# Patient Record
Sex: FEMALE | Race: OTHER | HISPANIC OR LATINO | URBAN - METROPOLITAN AREA
[De-identification: names, ages, dates, MRNs, and addresses within clinical notes are randomized per-mention and may not be internally consistent; named-entity substitution may affect disease eponyms.]

---

## 2017-01-27 ENCOUNTER — OUTPATIENT (OUTPATIENT)
Dept: OUTPATIENT SERVICES | Facility: HOSPITAL | Age: 27
LOS: 1 days | Discharge: HOME | End: 2017-01-27

## 2017-01-27 ENCOUNTER — APPOINTMENT (OUTPATIENT)
Dept: SPEECH THERAPY | Facility: CLINIC | Age: 27
End: 2017-01-27

## 2017-01-27 DIAGNOSIS — Z98.89 OTHER SPECIFIED POSTPROCEDURAL STATES: Chronic | ICD-10-CM

## 2017-01-30 DIAGNOSIS — H90.3 SENSORINEURAL HEARING LOSS, BILATERAL: ICD-10-CM

## 2017-04-07 ENCOUNTER — APPOINTMENT (OUTPATIENT)
Dept: SPEECH THERAPY | Facility: CLINIC | Age: 27
End: 2017-04-07

## 2017-06-30 ENCOUNTER — APPOINTMENT (OUTPATIENT)
Dept: SPEECH THERAPY | Facility: CLINIC | Age: 27
End: 2017-06-30

## 2017-06-30 ENCOUNTER — OUTPATIENT (OUTPATIENT)
Dept: OUTPATIENT SERVICES | Facility: HOSPITAL | Age: 27
LOS: 1 days | Discharge: HOME | End: 2017-06-30

## 2017-06-30 DIAGNOSIS — Z98.89 OTHER SPECIFIED POSTPROCEDURAL STATES: Chronic | ICD-10-CM

## 2017-07-05 DIAGNOSIS — H90.3 SENSORINEURAL HEARING LOSS, BILATERAL: ICD-10-CM

## 2017-08-18 ENCOUNTER — APPOINTMENT (OUTPATIENT)
Dept: SPEECH THERAPY | Facility: CLINIC | Age: 27
End: 2017-08-18

## 2017-10-03 ENCOUNTER — APPOINTMENT (OUTPATIENT)
Dept: SPEECH THERAPY | Facility: CLINIC | Age: 27
End: 2017-10-03

## 2017-12-29 ENCOUNTER — APPOINTMENT (OUTPATIENT)
Dept: SPEECH THERAPY | Facility: CLINIC | Age: 27
End: 2017-12-29

## 2018-02-06 ENCOUNTER — OUTPATIENT (OUTPATIENT)
Dept: OUTPATIENT SERVICES | Facility: HOSPITAL | Age: 28
LOS: 1 days | Discharge: HOME | End: 2018-02-06

## 2018-02-06 ENCOUNTER — APPOINTMENT (OUTPATIENT)
Dept: SPEECH THERAPY | Facility: CLINIC | Age: 28
End: 2018-02-06

## 2018-02-06 DIAGNOSIS — Z98.89 OTHER SPECIFIED POSTPROCEDURAL STATES: Chronic | ICD-10-CM

## 2018-03-05 DIAGNOSIS — H90.3 SENSORINEURAL HEARING LOSS, BILATERAL: ICD-10-CM

## 2018-08-17 ENCOUNTER — APPOINTMENT (OUTPATIENT)
Dept: SPEECH THERAPY | Facility: CLINIC | Age: 28
End: 2018-08-17

## 2018-10-26 ENCOUNTER — OUTPATIENT (OUTPATIENT)
Dept: OUTPATIENT SERVICES | Facility: HOSPITAL | Age: 28
LOS: 1 days | Discharge: HOME | End: 2018-10-26

## 2018-10-26 ENCOUNTER — APPOINTMENT (OUTPATIENT)
Dept: SPEECH THERAPY | Facility: CLINIC | Age: 28
End: 2018-10-26

## 2018-10-26 DIAGNOSIS — H90.3 SENSORINEURAL HEARING LOSS, BILATERAL: ICD-10-CM

## 2018-10-26 DIAGNOSIS — Z98.89 OTHER SPECIFIED POSTPROCEDURAL STATES: Chronic | ICD-10-CM

## 2019-04-04 ENCOUNTER — APPOINTMENT (OUTPATIENT)
Dept: SPEECH THERAPY | Facility: CLINIC | Age: 29
End: 2019-04-04

## 2019-12-17 ENCOUNTER — OUTPATIENT (OUTPATIENT)
Dept: OUTPATIENT SERVICES | Facility: HOSPITAL | Age: 29
LOS: 1 days | Discharge: HOME | End: 2019-12-17

## 2019-12-17 ENCOUNTER — APPOINTMENT (OUTPATIENT)
Dept: SPEECH THERAPY | Facility: CLINIC | Age: 29
End: 2019-12-17

## 2019-12-17 DIAGNOSIS — Z98.89 OTHER SPECIFIED POSTPROCEDURAL STATES: Chronic | ICD-10-CM

## 2019-12-30 DIAGNOSIS — H90.3 SENSORINEURAL HEARING LOSS, BILATERAL: ICD-10-CM

## 2020-02-28 ENCOUNTER — NON-APPOINTMENT (OUTPATIENT)
Age: 30
End: 2020-02-28

## 2021-04-05 ENCOUNTER — RESULT REVIEW (OUTPATIENT)
Age: 31
End: 2021-04-05

## 2021-07-23 ENCOUNTER — OUTPATIENT (OUTPATIENT)
Dept: OUTPATIENT SERVICES | Facility: HOSPITAL | Age: 31
LOS: 1 days | Discharge: HOME | End: 2021-07-23

## 2021-07-23 ENCOUNTER — APPOINTMENT (OUTPATIENT)
Dept: SPEECH THERAPY | Facility: CLINIC | Age: 31
End: 2021-07-23

## 2021-07-23 DIAGNOSIS — Z98.89 OTHER SPECIFIED POSTPROCEDURAL STATES: Chronic | ICD-10-CM

## 2021-09-28 ENCOUNTER — APPOINTMENT (OUTPATIENT)
Dept: SPEECH THERAPY | Facility: CLINIC | Age: 31
End: 2021-09-28

## 2021-10-22 DIAGNOSIS — H90.3 SENSORINEURAL HEARING LOSS, BILATERAL: ICD-10-CM

## 2022-06-29 ENCOUNTER — RESULT REVIEW (OUTPATIENT)
Age: 32
End: 2022-06-29

## 2022-08-05 ENCOUNTER — APPOINTMENT (OUTPATIENT)
Dept: SPEECH THERAPY | Facility: CLINIC | Age: 32
End: 2022-08-05

## 2022-08-05 ENCOUNTER — OUTPATIENT (OUTPATIENT)
Dept: OUTPATIENT SERVICES | Facility: HOSPITAL | Age: 32
LOS: 1 days | Discharge: HOME | End: 2022-08-05

## 2022-08-05 DIAGNOSIS — Z98.89 OTHER SPECIFIED POSTPROCEDURAL STATES: Chronic | ICD-10-CM

## 2022-08-18 DIAGNOSIS — H90.3 SENSORINEURAL HEARING LOSS, BILATERAL: ICD-10-CM

## 2022-08-26 ENCOUNTER — APPOINTMENT (OUTPATIENT)
Dept: SPEECH THERAPY | Facility: CLINIC | Age: 32
End: 2022-08-26

## 2022-09-29 ENCOUNTER — APPOINTMENT (OUTPATIENT)
Dept: SPEECH THERAPY | Facility: CLINIC | Age: 32
End: 2022-09-29

## 2022-12-07 ENCOUNTER — RESULT REVIEW (OUTPATIENT)
Age: 32
End: 2022-12-07

## 2022-12-07 ENCOUNTER — APPOINTMENT (OUTPATIENT)
Dept: UROLOGY | Facility: CLINIC | Age: 32
End: 2022-12-07

## 2022-12-07 VITALS
TEMPERATURE: 98 F | HEART RATE: 73 BPM | DIASTOLIC BLOOD PRESSURE: 72 MMHG | OXYGEN SATURATION: 100 % | HEIGHT: 67 IN | RESPIRATION RATE: 16 BRPM | SYSTOLIC BLOOD PRESSURE: 112 MMHG

## 2022-12-07 DIAGNOSIS — N20.0 CALCULUS OF KIDNEY: ICD-10-CM

## 2022-12-07 DIAGNOSIS — Z87.440 PERSONAL HISTORY OF URINARY (TRACT) INFECTIONS: ICD-10-CM

## 2022-12-07 PROCEDURE — 99203 OFFICE O/P NEW LOW 30 MIN: CPT

## 2022-12-07 NOTE — HISTORY OF PRESENT ILLNESS
[FreeTextEntry1] : She is a Faroese-speaking 32-year-old woman who is seen today for initial visit.  Patient states that the last few urine cultures have shown infection but she has not had any significant symptoms.  There is no hematuria or dysuria.  She has bilateral lower back pain.  More than 5 years ago, she had a kidney stone removed by laser, according to the patient.  She has not had any recent imaging studies.  Residual urine volume today was minimal.  She had 1 vaginal delivery.  She has a cochlear implant.

## 2022-12-07 NOTE — REVIEW OF SYSTEMS
[Date of last menstrual period ____] : date of last menstrual period: [unfilled] [Urine Infection (bladder/kidney)] : bladder/kidney infection [Negative] : Heme/Lymph

## 2022-12-07 NOTE — ASSESSMENT
[FreeTextEntry1] : Urine studies will be sent today.  She seems to be asymptomatic.  We will try to obtain her recent urine culture results.  Most importantly, she will undergo imaging studies with ultrasound to see if she has any recurrent stones.  The difference between acute urinary tract infection and asymptomatic bacteriuria were discussed.  She will continue to remain hydrated.  We will discuss the results on the phone.\par \par Edgar Edouard MD, FACS\par [facility]\par  of Urology\par \par [address], [address]\[place] [address]\par \par [address], [address]\[place] [address]\par \par Tel: [phone]\par Fax: [phone]

## 2022-12-07 NOTE — LETTER BODY
[Dear  ___] : Dear  [unfilled], [Consult Letter:] : I had the pleasure of evaluating your patient, [unfilled]. [Consult Closing:] : Thank you very much for allowing me to participate in the care of this patient.  If you have any questions, please do not hesitate to contact me. [FreeTextEntry1] : Address: [address] [place] [id number]\par Tel: [phone]

## 2022-12-07 NOTE — PHYSICAL EXAM
[General Appearance - Well Developed] : well developed [General Appearance - Well Nourished] : well nourished [Normal Appearance] : normal appearance [Well Groomed] : well groomed [General Appearance - In No Acute Distress] : no acute distress [Edema] : no peripheral edema [Respiration, Rhythm And Depth] : normal respiratory rhythm and effort [Exaggerated Use Of Accessory Muscles For Inspiration] : no accessory muscle use [Abdomen Soft] : soft [Abdomen Tenderness] : non-tender [Costovertebral Angle Tenderness] : no ~M costovertebral angle tenderness [FreeTextEntry1] : Bladder not palpable [Normal Station and Gait] : the gait and station were normal for the patient's age [] : no rash [No Focal Deficits] : no focal deficits [Oriented To Time, Place, And Person] : oriented to person, place, and time [Affect] : the affect was normal [Mood] : the mood was normal [Not Anxious] : not anxious

## 2022-12-09 LAB — BACTERIA UR CULT: NORMAL

## 2022-12-15 ENCOUNTER — OUTPATIENT (OUTPATIENT)
Dept: OUTPATIENT SERVICES | Facility: HOSPITAL | Age: 32
LOS: 1 days | End: 2022-12-15
Payer: MEDICAID

## 2022-12-15 ENCOUNTER — APPOINTMENT (OUTPATIENT)
Dept: ULTRASOUND IMAGING | Facility: IMAGING CENTER | Age: 32
End: 2022-12-15

## 2022-12-15 DIAGNOSIS — N20.0 CALCULUS OF KIDNEY: ICD-10-CM

## 2022-12-15 DIAGNOSIS — Z98.89 OTHER SPECIFIED POSTPROCEDURAL STATES: Chronic | ICD-10-CM

## 2022-12-15 PROCEDURE — 76770 US EXAM ABDO BACK WALL COMP: CPT

## 2022-12-15 PROCEDURE — 76770 US EXAM ABDO BACK WALL COMP: CPT | Mod: 26

## 2022-12-19 ENCOUNTER — NON-APPOINTMENT (OUTPATIENT)
Age: 32
End: 2022-12-19

## 2022-12-19 DIAGNOSIS — N39.0 URINARY TRACT INFECTION, SITE NOT SPECIFIED: ICD-10-CM

## 2022-12-19 LAB
APPEARANCE: CLEAR
BACTERIA: NEGATIVE
BILIRUBIN URINE: NEGATIVE
BLOOD URINE: ABNORMAL
COLOR: NORMAL
GLUCOSE QUALITATIVE U: NEGATIVE
HYALINE CASTS: 0 /LPF
KETONES URINE: NEGATIVE
LEUKOCYTE ESTERASE URINE: NEGATIVE
MICROSCOPIC-UA: NORMAL
NITRITE URINE: NEGATIVE
PH URINE: 6
PROTEIN URINE: NEGATIVE
RED BLOOD CELLS URINE: 52 /HPF
SPECIFIC GRAVITY URINE: 1.01
SQUAMOUS EPITHELIAL CELLS: 1 /HPF
UROBILINOGEN URINE: NORMAL
WHITE BLOOD CELLS URINE: 1 /HPF

## 2023-01-22 LAB — T PALLIDUM AB SER QL IA: NEGATIVE

## 2023-01-24 ENCOUNTER — APPOINTMENT (OUTPATIENT)
Dept: SPEECH THERAPY | Facility: CLINIC | Age: 33
End: 2023-01-24

## 2023-02-06 NOTE — ASSESSMENT
[FreeTextEntry1] : Right implant\par : Cochlear\par Internal: \par Surgery date: 7/1/16\par Initial stimulation: 7/29/16 \par Surgeon: Dr. Gutierrez\par Processor type(s): N6 processors\par Magnet strength: \par \par Patient reports current equipment to be working well. Advised patient to begin upgrade process in the beginning of April as N6 equipment will then be obsolete; per [facility] will be eligible for upgrade at that time. Provided patient with [employer] reimbursement # and contact information for Eileen Mitchell for assistance, however, patient requested to schedule appointment to come into clinic for assistance if possible. \par \par MAPPING:\par RIGHT\par Impedances stable and WNL (all electrodes enabled)\par Data logging indicates greatly improved use (consistent use of ~13.9 hours daily); majority of time spent in speech\par ACE, MP1+2, 900Hz, Maxima 10, PW25\par MAP55: New T levels based on counted responses and scaled C levels for loudness. New MAP with PW25 (now with better compliance levels and able to reduce PW). Increased maxima to 10. Patient able to tolerate new MAP set at counted/scaled levels\par SCAN on, Volume 6, Sensitivity 12, Soft Start: 2 seconds\par \par Patient reports that Lorraine Ivory ([facility] who dispensed left hearing aid) would like to see updated hearing values/benefit with amplification. Screening of unaided thresholds performed today as well as functional gain with cochlear implant. Results indicate essentially severe hearing loss in the left ear and profound hearing loss in the right ear (no response at equipment limits). Functional gain testing with MAP55 and N6 processor revealed responses to FM tones ranging from 30-35dBHL (with 45dBHL threshold noted at 3kHz). Fair speech recognition score of 76% obtained with right N6 processor only via Italian recorded word list.

## 2023-02-06 NOTE — PLAN
[FreeTextEntry2] : \par 1) Continued otologic monitoring\par 2) Continued bimodal amplification (CI-right, HA-left)\par 3) Routine hearing aid check (at dispensing vendor) and routine mapping as needed (follow-up scheduled in April to assist with upgrade process)\par 4) Annual audiological evaluation to monitor, sooner if change in hearing suspected or otherwise medically indicated

## 2023-02-06 NOTE — REASON FOR VISIT
[Follow-Up] : follow-up for [Cochlear Implant] : cochlear implant [[employer] ] : provided by [employer]   [Time Spent: ____ minutes] : Total time spent using  services: [unfilled] minutes. The patient's primary language is not English thus required  services. [Interpreters_IDNumber] : [id number] [Interpreters_FullName] : Manish [TWNoteComboBox1] : Albanian

## 2023-02-06 NOTE — HISTORY OF PRESENT ILLNESS
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 She was reportedly fit with binaural hearing aids as an infant in [place], which she wore consistently until she moved to the [place]. Patient reports lapse in amplification use as one aid broke and the other one was lost when she moved here in 2013. Hearing aids weren't replaced until January 2016. Patient is now bimodal with right Internal 512, external N6 processor and left [employer] BTE hearing aid.\par  [FreeTextEntry8] : Patient returns today for routine mapping- wearing P4 comfortably and consistently.

## 2023-02-06 NOTE — PROCEDURE
[] : Acoustic Immittance: [226 Hz] : 226 Hz [Normal Eardrum Mobility] : consistent with normal eardrum mobility [Type A Tympanogram] : Type A Normal

## 2023-02-21 LAB
APPEARANCE: ABNORMAL
BACTERIA: NEGATIVE
BILIRUBIN URINE: NEGATIVE
BLOOD URINE: ABNORMAL
COLOR: NORMAL
GLUCOSE QUALITATIVE U: NEGATIVE
HYALINE CASTS: 0 /LPF
KETONES URINE: NEGATIVE
LEUKOCYTE ESTERASE URINE: NEGATIVE
MICROSCOPIC-UA: NORMAL
NITRITE URINE: NEGATIVE
PH URINE: 6
PROTEIN URINE: NEGATIVE
RED BLOOD CELLS URINE: 0 /HPF
SPECIFIC GRAVITY URINE: 1.02
SQUAMOUS EPITHELIAL CELLS: 10 /HPF
URINE COMMENTS: NORMAL
UROBILINOGEN URINE: NORMAL
WHITE BLOOD CELLS URINE: 1 /HPF

## 2023-04-07 ENCOUNTER — APPOINTMENT (OUTPATIENT)
Dept: SPEECH THERAPY | Facility: CLINIC | Age: 33
End: 2023-04-07

## 2023-04-07 ENCOUNTER — OUTPATIENT (OUTPATIENT)
Dept: OUTPATIENT SERVICES | Facility: HOSPITAL | Age: 33
LOS: 1 days | Discharge: HOME | End: 2023-04-07

## 2023-04-07 DIAGNOSIS — Z98.89 OTHER SPECIFIED POSTPROCEDURAL STATES: Chronic | ICD-10-CM

## 2023-04-20 DIAGNOSIS — H90.3 SENSORINEURAL HEARING LOSS, BILATERAL: ICD-10-CM

## 2023-06-20 ENCOUNTER — APPOINTMENT (OUTPATIENT)
Dept: SPEECH THERAPY | Facility: CLINIC | Age: 33
End: 2023-06-20

## 2023-07-03 ENCOUNTER — APPOINTMENT (OUTPATIENT)
Dept: SPEECH THERAPY | Facility: CLINIC | Age: 33
End: 2023-07-03

## 2023-07-21 ENCOUNTER — APPOINTMENT (OUTPATIENT)
Dept: SPEECH THERAPY | Facility: CLINIC | Age: 33
End: 2023-07-21

## 2023-08-02 NOTE — PLAN
[FreeTextEntry2] : 1) Continued otologic monitoring 2) Continued bimodal amplification (right- CI, left-HA) 3) Routine audiological monitoring and hearing aid checks for left ear 4) Patient to return in 2 weeks to assess progress with new equipment and for pairing of Resound hearing aid and N8 processor with phone clip for bimodal streaming.

## 2023-08-02 NOTE — ASSESSMENT
[FreeTextEntry1] : EQUIPMENT Right implant Left Resound BTE (obtained from outside vendor-[employer] [facility]) : Cochlear Internal:  Surgery date: 7/1/16 Initial stimulation: 7/29/16 Surgeon: Dr. Gutierrez Processor type(s): N6 processors Magnet strength: 6 (integrated coil/cable)  MAPPING: Impedances stable and WNL on all electrodes Most recent N6 MAP(61) converted for N8 processor MAP(62) ACE, MP1+2, 900Hz, Maxima 10, PW25 MAP62: C's + 3CU re: MAP61. Patient reports to feel comfortable with current mapping and to be able to hear well. SCAN2, volume 6, sensitivity 12, soft start: 2 seconds All adjustment controls active  Patient with android phone- unable to stream to both Resound and N8 processor without phone clip. Patient has phone clip but did not bring with her today. N8 processor paired to phone and rosa m; streaming and use of rosa m reviewed with patient.   Reviewed all contents of upgrade kit including aqua accessory. Reviewed routine maintenance with periodic change of microphone covers and differences between N6 and N8 processor. Answered all questions to patient's  satisfaction.

## 2023-08-02 NOTE — HISTORY OF PRESENT ILLNESS
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 She was reportedly fit with binaural hearing aids as an infant in [place], which she wore consistently until she moved to the [place]. Patient reports lapse in amplification use as one aid broke and the other one was lost when she moved here in 2013. Hearing aids weren't replaced until January 2016. Patient is now bimodal with right Internal 512, external N6 processor and left Resound BTE hearing aid.  [FreeTextEntry8] : Patient returns today for programming of upgraded N8 equipment.

## 2023-08-04 NOTE — ASSESSMENT
[FreeTextEntry1] : EQUIPMENT Right implant Left Resound BTE (obtained from outside vendor-[facility]orraine Ivory) : Cochlear Internal:  Surgery date: 7/1/16 Initial stimulation: 7/29/16  Surgeon: Dr. Gutierrez Processor type(s): N8 processor, back-up N6 processor Magnet strength:  MAPPING: Impedances stable and WNL on all electrodes ACE, MP1+2, 900Hz, Maxima 10, PW25  MAP62: No changes made to current mapping based on functional gain and good speech perception results. Patient happy with the sound quality of current MAP.  SCAN2, volume 6, sensitivity 12, soft start: 2 seconds All adjustment controls active  Paired N8 processor and resound HA to MM2+ and phone clip. Turned off all system sounds on phone and restarted [employer] rosa m. Called patient from office-- patient noted static was gone after changes were made. Patient reports to be able to hear well while streaming phone calls and through MM2+. Patient expressed satisfaction with today's services.

## 2023-08-04 NOTE — PROCEDURE
[Good] : good [de-identified] : Functional gain testing with MAP62 (ACE, 900Hz, MP1+2, 900Hz, Maxima 10, PW25) revealed responses to FM tones ranging from 30-35dBHL, 250-4kHz, with 45dBHL threshold noted at 6kHz.   Faroese [employer] Sentence Test with N8 processor at 50dBHL (in quiet): 81% correct

## 2023-08-04 NOTE — REASON FOR VISIT
[Follow-Up] : follow-up for [Cochlear Implant] : cochlear implant [Time Spent: ____ minutes] : Total time spent using  services: [unfilled] minutes. The patient's primary language is not English thus required  services. [Interpreters_IDNumber] : [id number] [Interpreters_FullName] : Jyoti [TWNoteComboBox1] : Latvian

## 2023-08-04 NOTE — HISTORY OF PRESENT ILLNESS
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 She was reportedly fit with binaural hearing aids as an infant in [place], which she wore consistently until she moved to the [place]. Patient reports lapse in amplification use as one aid broke and the other one was lost when she moved here in 2013. Hearing aids weren't replaced until January 2016. Patient is now bimodal with right Internal 512, external N8 processor and left Resound BTE hearing aid [FreeTextEntry8] : Patient with recent upgrade to N8 equipment- returns today for  assistance with pairing MM2+ and phone clip to processor and hearing aid. Patient reports to be hearing well with new N8 processor so far, only issue is intermittency while streaming from cell phone to cochlear implant; patient reports to hear static while streaming phone calls.

## 2023-08-04 NOTE — PLAN
[FreeTextEntry2] : 1. Continued otologic monitoring 2. Continued bimodal amplification (right-CI, left-HA) 3. Routine audiological monitoring and hearing aid checks for left ear  4. Routine annual mapping for right ear, sooner if needed

## 2024-04-26 ENCOUNTER — APPOINTMENT (OUTPATIENT)
Dept: SPEECH THERAPY | Facility: CLINIC | Age: 34
End: 2024-04-26
Payer: MEDICAID

## 2024-04-26 ENCOUNTER — OUTPATIENT (OUTPATIENT)
Dept: OUTPATIENT SERVICES | Facility: HOSPITAL | Age: 34
LOS: 1 days | Discharge: HOME | End: 2024-04-26

## 2024-04-26 DIAGNOSIS — Z98.89 OTHER SPECIFIED POSTPROCEDURAL STATES: Chronic | ICD-10-CM

## 2024-04-26 PROCEDURE — 92604 REPROGRAM COCHLEAR IMPLT 7/>: CPT

## 2024-05-02 NOTE — PLAN
[FreeTextEntry2] : 1. Continued otologic monitoring. 2. Continued bimodal amplification (right CI + left HA). 3. Routine audiological monitoring and hearing aid checks for left ear.  4. Routine annual mapping for right ear, sooner if needed.

## 2024-05-02 NOTE — REASON FOR VISIT
[Follow-Up] : follow-up for [Cochlear Implant] : cochlear implant [Spouse] : spouse [Other: _____] : [unfilled] [Ad Hoc ] : provided by an ad hoc  [Interpreters_Relationshiptopatient] : Spouse [TWNoteComboBox1] : [place]

## 2024-05-02 NOTE — ASSESSMENT
[FreeTextEntry1] : EQUIPMENT Right implant Left Resound BTE (obtained from outside vendor-[facility]or[facility] Iv[facility]) : Cochlear Internal:  Surgery date: 7/1/16 Initial stimulation: 7/29/16 Surgeon: Dr. Gutierrez Processor type(s): N8 processor, back-up N6 processor Magnet strength:  MAPPING: Impedances stable and WNL on all electrodes ACE, MP1+2, 900Hz, Maxima 10, PW25 MAP62: Current MAP within compliance. Patient denies the need for changes to current programming and reports to be happy with the sound quality of current MAP. SCAN2, volume 6, sensitivity 12, soft start: 2 seconds All adjustment controls active  Patient brought all upgrade equipment today. Assisted patient in changing N8 microphone cover with patient's stock and reviewed use of aqua kit per patient request. Advised to use rechargeable batteries with aqua kit as aqua cover does not allow for air activated disposable batteries to work properly. Discussed routine maintenance/changing of microphone covers. Patient indicated she understood.   Created RMA for 2 N8 rechargeable batteries through [facility] to be sent to patient's home address.

## 2024-05-02 NOTE — HISTORY OF PRESENT ILLNESS
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 She was reportedly fit with binaural hearing aids as an infant in [place], which she wore consistently until she moved to the [place]. Patient reports lapse in amplification use as one aid broke and the other one was lost when she moved here in 2013. Hearing aids weren't replaced until January 2016. Patient is now bimodal with right Internal 512, external N8 processor and left Resound BTE hearing aid.  [FreeTextEntry8] : Patient returns today for assistance with ordering a new rechargeable battery. Patient has been utilizing disposable batteries. Patient reports to be hearing well with new N8 processor and denies need for programming adjustments. Patient and spouse express that they would like their 3 month old son to have a diagnostic ABR due to mother's history of hearing loss- will discuss with  to assist with scheduling.

## 2024-05-16 DIAGNOSIS — H90.3 SENSORINEURAL HEARING LOSS, BILATERAL: ICD-10-CM
